# Patient Record
Sex: MALE | Race: WHITE | Employment: FULL TIME | URBAN - METROPOLITAN AREA
[De-identification: names, ages, dates, MRNs, and addresses within clinical notes are randomized per-mention and may not be internally consistent; named-entity substitution may affect disease eponyms.]

---

## 2017-01-03 ENCOUNTER — COMMUNICATION - HEALTHEAST (OUTPATIENT)
Dept: SCHEDULING | Facility: CLINIC | Age: 42
End: 2017-01-03

## 2017-03-22 ENCOUNTER — OFFICE VISIT - HEALTHEAST (OUTPATIENT)
Dept: FAMILY MEDICINE | Facility: CLINIC | Age: 42
End: 2017-03-22

## 2017-03-22 ENCOUNTER — HOSPITAL ENCOUNTER (OUTPATIENT)
Dept: CT IMAGING | Facility: CLINIC | Age: 42
Discharge: HOME | End: 2017-03-22

## 2017-03-22 ENCOUNTER — COMMUNICATION - HEALTHEAST (OUTPATIENT)
Dept: SCHEDULING | Facility: CLINIC | Age: 42
End: 2017-03-22

## 2017-03-22 DIAGNOSIS — K52.9 COLITIS: ICD-10-CM

## 2017-03-22 DIAGNOSIS — R10.9 ABDOMINAL PAIN: ICD-10-CM

## 2017-03-27 ENCOUNTER — OFFICE VISIT - HEALTHEAST (OUTPATIENT)
Dept: INTERNAL MEDICINE | Facility: CLINIC | Age: 42
End: 2017-03-27

## 2017-03-27 DIAGNOSIS — K57.20 DIVERTICULITIS OF LARGE INTESTINE WITH ABSCESS WITHOUT BLEEDING: ICD-10-CM

## 2017-03-30 ENCOUNTER — COMMUNICATION - HEALTHEAST (OUTPATIENT)
Dept: ADMINISTRATIVE | Facility: CLINIC | Age: 42
End: 2017-03-30

## 2021-05-30 VITALS — WEIGHT: 198 LBS | BODY MASS INDEX: 29.24 KG/M2

## 2021-05-30 VITALS — WEIGHT: 196.6 LBS | BODY MASS INDEX: 29.03 KG/M2

## 2021-06-09 NOTE — PROGRESS NOTES
Subjective:      Patient ID: Lonnie Ashby is a 42 y.o. male.    Chief Complaint:    HPI Lonnie Ashby is a 42 y.o. male who presents today complaining of LLQ abdominal pain ×2 days.  Patient has a significant history of diverticulitis, he has had about 5 flares in the past 5 years.  He was once hospitalized at [facility] for 5 days.  His last flare was in December, he was seen in the emergency department and treated outpatient with Cipro and Flagyl.  He was unable to complete the course of antibiotics because he left them in his hotel room when he was traveling.  2 days ago he started having lower left quadrant abdominal pain, it is sharp 8 out of 10 pain.  It feels more sudden and more severe than previous diverticulitis flares in the past.  His last normal bowel movement was 2 days ago, he has had small bowel movements yesterday and today.  He has a constant sensation that he needs to have a bowel movement.  He has been afebrile.  No nausea or vomiting.  No blood in the stools.        No past medical history on file.    No past surgical history on file.    No family history on file.    Social History   Substance Use Topics     Smoking status: Current Every Day Smoker     Smokeless tobacco: None     Alcohol use None       Review of Systems   Constitutional: Positive for appetite change. Negative for fever.   Gastrointestinal: Positive for abdominal pain. Negative for abdominal distention, anal bleeding, blood in stool, constipation, diarrhea, nausea and vomiting.        Positive for tenesmus and decreased stool size.   Genitourinary: Negative for difficulty urinating and dysuria.   Musculoskeletal: Positive for gait problem.   Skin: Negative for pallor and rash.       Objective:     Visit Vitals     /72     Pulse 81     Temp 97.9  F (36.6  C) (Oral)     Resp 16     Wt 196 lb 9.6 oz (89.2 kg)     SpO2 100%     BMI 29.03 kg/m2       Physical Exam   Constitutional: He appears well-developed and well-nourished.    Patient is lying still on the exam table on his back.  He is covered with a blanket, because he was feeling cold.  He does not turn to move his body to look at me when I walk into the room.   Abdominal: There is no hepatosplenomegaly. There is tenderness in the left lower quadrant. There is guarding.   Patient has guarding of the lower left quadrant, deep palpation not evaluated due to pain.   Neurological: Gait abnormal. Coordination normal.   Patient's gait was slightly antalgic to minimize movement of his abdomen.     Recent Results (from the past 24 hour(s))   HM2(CBC w/o Differential)   Result Value Ref Range    WBC 13.1 (H) 4.0 - 11.0 thou/uL    RBC 5.24 4.40 - 6.20 mill/uL    Hemoglobin 16.1 14.0 - 18.0 g/dL    Hematocrit 47.1 40.0 - 54.0 %    MCV 90 80 - 100 fL    MCH 30.7 27.0 - 34.0 pg    MCHC 34.2 32.0 - 36.0 g/dL    RDW 12.8 11.0 - 14.5 %    Platelets 149 140 - 440 thou/uL    MPV 7.7 7.0 - 10.0 fL   C-Reactive Protein(CRP)   Result Value Ref Range    CRP 3.6 (H) 0.0 - 0.8 mg/dL     Ct Abdomen Pelvis With Oral With Iv Contrast    Result Date: 3/22/2017  CT ABDOMEN PELVIS W ORAL W IV CONTRAST 3/22/2017 6:45 PM     INDICATION: LLQ abdominal pain. History of diverticulitis. TECHNIQUE: CT abdomen and pelvis. Multiplanar reformation images (MPR). Dose reduction techniques were used. IV CONTRAST: 100 mL Omnipaque 350. COMPARISON: None. FINDINGS: LUNG BASES: Negative. ABDOMEN: Tiny probable benign hepatic cysts. Normal-appearing gallbladder and biliary system, spleen, pancreas, adrenals and kidneys. No retroperitoneal mass or effusion. Caliber the bowel is normal. Normal appendix mid right abdomen. PELVIS: 10 cm segment of bowel wall thickening and pericolonic inflammation involving the proximal sigmoid colon with associated diverticular disease compatible with acute diverticulitis. There is a 3 x 2 cm intramural abscess on images 95 and 96. Normal  appendix. MUSCULOSKELETAL: Negative.     CONCLUSION: 1.  Fine  is compatible with acute diverticulitis proximal sigmoid colon. 3 x 2 cm intramural abscess 2.  2. Small for percutaneous drainage. Consider short interval follow-up imaging if patient is not improving clinically on treatment. Report called to Dr. Cunningham.      Procedures    Assessment / Plan:     1. Abdominal pain  HM2(CBC w/o Differential)    C-Reactive Protein(CRP)    HM1 (CBC and Differential)    HM1 (CBC with Diff)    CT Abdomen Pelvis With Oral With IV Contrast    CANCELED: CT Abdomen With Oral With IV Contrast   2. Colitis  metroNIDAZOLE (FLAGYL) 500 MG tablet    ciprofloxacin HCl (CIPRO) 500 MG tablet         Patient Instructions   1) Complete full course of antibiotics.  2) Follow-up in 3-4 days, establish care with primary.  Possibly reevaluate abscess if symptoms are not improving with repeat CT.  3) Use Tylenol for pain control.  4) Start with clear liquid diet and advance as tolerated.  5) seek emergency medical attention if you develop worsening of your pain, fever above 100.5.

## 2021-06-09 NOTE — PROGRESS NOTES
[facility] Clinic Note  Patient Name: Lonnie Ashby  Patient Age: 42 y.o.  YOB: 1975  MRN: [id number]  ?  Date of Visit: 3/27/2017  Reason for Office Visit:   Chief Complaint   Patient presents with     Follow-up     [facility] f/u for diverticulitis         HPI: Lonnie Ashby 42 y.o. male who presents to clinic for f/u [facility] on 3/22 for diverticulitis. He has a history of diverticulitis and was seen of LLQ pain. CT scan demstrated diverticulitis in signmoid colon with a 3 x 2 cm intramural abscess. At that time he was afebrile, and stable. He was treated outpatient with cipro and flagyl and advised clear liquid diet until symptoms start to improve. His WBC was 13.4 with left shift and elevated CRP  He was doing a clear liquid diet for most part. Did have pasta one day. His pain was quite severe last night. No nausea/vomiting. Has been afebrile     He had a normal colonoscopy when he was 39 he says and a history of laparoscopic hernia repair surgery.     Review of Systems: As noted in HPI     Current Scheduled Meds:  Outpatient Encounter Prescriptions as of 3/27/2017   Medication Sig Dispense Refill     ciprofloxacin HCl (CIPRO) 500 MG tablet Take 1 tablet (500 mg total) by mouth 2 (two) times a day for 14 days. 28 tablet 0     metroNIDAZOLE (FLAGYL) 500 MG tablet Take 1 tablet (500 mg total) by mouth 2 (two) times a day for 14 days. No alcohol while on this medication. 28 tablet 0     No facility-administered encounter medications on file as of 3/27/2017.        Objective / Physical Examination:  /70 (Patient Site: Right Arm, Patient Position: Sitting, Cuff Size: Adult Regular)  Pulse 84  Temp 97.6  F (36.4  C)  Wt 198 lb (89.8 kg)  BMI 29.24 kg/m2  Wt Readings from Last 3 Encounters:   03/27/17 198 lb (89.8 kg)   03/22/17 196 lb 9.6 oz (89.2 kg)   12/28/16 206 lb (93.4 kg)     Body mass index is 29.24 kg/(m^2). (>25?)    General Appearance: Alert and oriented in no acute distress  Lungs:  Clear to auscultation bilaterally. Normal inspiratory and expiratory effort.  Cardiovascular: RRR  Abdomen: Bowel sounds active all four quadrants. Soft, LLQ tenderness to minimal palpation   Integumentary: Warm and dry    Assessment / Plan / Medical Decision Making:      Encounter Diagnoses   Name Primary?     Diverticulitis of large intestine with abscess without bleeding Yes        1. Diverticulitis of large intestine with abscess without bleeding    Follow up with minimal improvement on oral antibiotics. 3x2 cm intramural abscess seen on CT. Vitals stable today.   It is likely he needs IV rx and possible percutaneous drainage of intramural abscess.   Report called to [facility] ED and patient was sent for evaluation.   He agreed with this plan     Follow up after discharge     Willian Olson MD  [facility]